# Patient Record
Sex: MALE | Race: WHITE | NOT HISPANIC OR LATINO | Employment: FULL TIME | URBAN - METROPOLITAN AREA
[De-identification: names, ages, dates, MRNs, and addresses within clinical notes are randomized per-mention and may not be internally consistent; named-entity substitution may affect disease eponyms.]

---

## 2017-09-27 DIAGNOSIS — S86.911A KNEE STRAIN, RIGHT, INITIAL ENCOUNTER: ICD-10-CM

## 2017-09-27 DIAGNOSIS — M25.561 KNEE PAIN, RIGHT: Primary | ICD-10-CM

## 2017-10-02 ENCOUNTER — HOSPITAL ENCOUNTER (OUTPATIENT)
Dept: RADIOLOGY | Facility: HOSPITAL | Age: 37
Discharge: HOME | End: 2017-10-02
Attending: NURSE PRACTITIONER
Payer: OTHER MISCELLANEOUS

## 2017-10-02 DIAGNOSIS — M25.561 KNEE PAIN, RIGHT: ICD-10-CM

## 2017-10-02 DIAGNOSIS — S86.911A KNEE STRAIN, RIGHT, INITIAL ENCOUNTER: ICD-10-CM

## 2017-10-02 PROCEDURE — 73721 MRI JNT OF LWR EXTRE W/O DYE: CPT | Mod: TC,RT

## 2017-10-02 PROCEDURE — 73721 MRI JNT OF LWR EXTRE W/O DYE: CPT | Mod: 26,RT,, | Performed by: RADIOLOGY

## 2017-10-11 ENCOUNTER — TELEPHONE (OUTPATIENT)
Dept: ORTHOPEDICS | Facility: CLINIC | Age: 37
End: 2017-10-11

## 2017-10-11 DIAGNOSIS — M25.561 RIGHT KNEE PAIN, UNSPECIFIED CHRONICITY: Primary | ICD-10-CM

## 2017-10-11 NOTE — TELEPHONE ENCOUNTER
----- Message from Connie Albright sent at 10/11/2017 12:38 PM CDT -----  Contact: [employer]  [employer] would like the nurse to please give her a call back today...[phone]

## 2017-10-20 ENCOUNTER — OFFICE VISIT (OUTPATIENT)
Dept: ORTHOPEDICS | Facility: CLINIC | Age: 37
End: 2017-10-20
Payer: OTHER MISCELLANEOUS

## 2017-10-20 ENCOUNTER — HOSPITAL ENCOUNTER (OUTPATIENT)
Dept: RADIOLOGY | Facility: HOSPITAL | Age: 37
Discharge: HOME | End: 2017-10-20
Attending: ORTHOPAEDIC SURGERY
Payer: OTHER MISCELLANEOUS

## 2017-10-20 VITALS
HEIGHT: 71 IN | DIASTOLIC BLOOD PRESSURE: 83 MMHG | SYSTOLIC BLOOD PRESSURE: 137 MMHG | HEART RATE: 90 BPM | BODY MASS INDEX: 30.8 KG/M2 | WEIGHT: 220 LBS

## 2017-10-20 DIAGNOSIS — S86.911A STRAIN OF RIGHT KNEE, INITIAL ENCOUNTER: Primary | ICD-10-CM

## 2017-10-20 DIAGNOSIS — M25.561 RIGHT KNEE PAIN, UNSPECIFIED CHRONICITY: Primary | ICD-10-CM

## 2017-10-20 DIAGNOSIS — M25.561 RIGHT KNEE PAIN, UNSPECIFIED CHRONICITY: ICD-10-CM

## 2017-10-20 PROCEDURE — 73564 X-RAY EXAM KNEE 4 OR MORE: CPT | Mod: 26,RT,, | Performed by: RADIOLOGY

## 2017-10-20 PROCEDURE — 99204 OFFICE O/P NEW MOD 45 MIN: CPT | Mod: S$GLB,,, | Performed by: ORTHOPAEDIC SURGERY

## 2017-10-20 PROCEDURE — 99999 PR PBB SHADOW E&M-EST. PATIENT-LVL III: CPT | Mod: PBBFAC,,, | Performed by: ORTHOPAEDIC SURGERY

## 2017-10-20 PROCEDURE — 73564 X-RAY EXAM KNEE 4 OR MORE: CPT | Mod: TC,PN,RT

## 2017-10-20 PROCEDURE — 73562 X-RAY EXAM OF KNEE 3: CPT | Mod: 26,59,LT, | Performed by: RADIOLOGY

## 2017-10-20 RX ORDER — IBUPROFEN 800 MG/1
800 TABLET ORAL DAILY
COMMUNITY

## 2017-10-20 NOTE — LETTER
October 24, 2017      Myles Griffiths, SAVANNA  [address]  [facility] [address]           [address]  [facility] [address]  Phone: [phone]          Patient: Gomez Hugo   MR Number: [id number]   YOB: 1980   Date of Visit: 10/20/2017       Dear Myles Griffiths:    Thank you for referring Gomez Hugo to me for evaluation. Attached you will find relevant portions of my assessment and plan of care.    If you have questions, please do not hesitate to call me. I look forward to following Gomez Hugo along with you.    Sincerely,    Александр Tsang MD    Enclosure  CC:  No Recipients    If you would like to receive this communication electronically, please contact [email or web address] or [phone] to request more information on enrich-in Link access.    For providers and/or their staff who would like to refer a patient to [facility], please contact us through our one-stop-shop provider referral line, [facility], at [phone].    If you feel you have received this communication in error or would no longer like to receive these types of communications, please e-mail [email or web address]

## 2017-10-24 NOTE — PROGRESS NOTES
History reviewed. No pertinent past medical history.    History reviewed. No pertinent surgical history.    Current Outpatient Prescriptions   Medication Sig    ibuprofen (ADVIL,MOTRIN) 800 MG tablet Take 800 mg by mouth once daily.     No current facility-administered medications for this visit.        Review of patient's allergies indicates:  No Known Allergies    History reviewed. No pertinent family history.    Social History     Social History    Marital status:      Spouse name: N/A    Number of children: N/A    Years of education: N/A     Occupational History    Not on file.     Social History Main Topics    Smoking status: Never Smoker    Smokeless tobacco: Never Used    Alcohol use No    Drug use: No    Sexual activity: Not on file     Other Topics Concern    Not on file     Social History Narrative    No narrative on file       Chief Complaint:   Chief Complaint   Patient presents with    Right Knee - Pain       Consulting Physician: Myles Griffiths NP    History of present illness:    This is a 37 y.o. year old male who complains of right knee pain following a twisting injury on 9/15/17.  He reports that the knee has been popping on him.  He reports he had a slip but did not fall.  He thinks the popping is in his patellar area.  He is wearing a brace because he feels his knee is unstable without it.  His pain is a 0-5 out of 10 depending on activities.    Review of Systems:    Constitution: Denies chills, fever, and sweats.  HENT: Denies headaches or blurry vision.  Cardiovascular: Denies chest pain or irregular heart beat.  Respiratory: Denies cough or shortness of breath.  Gastrointestinal: Denies abdominal pain, nausea, or vomiting.  Musculoskeletal:  Denies muscle cramps.  Neurological: Denies dizziness or focal weakness.  Psychiatric/Behavioral: Normal mental status.  Hematologic/Lymphatic: Denies bleeding problem or easy bruising/bleeding.  Skin: Denies rash or suspicious  "lesions.    Examination:    Vital Signs:    Vitals:    10/20/17 1449   BP: 137/83   Pulse: 90   Weight: 99.8 kg (220 lb)   Height: 5' 11" (1.803 m)   PainSc: 0-No pain   PainLoc: Knee       Body mass index is 30.68 kg/m².    This a well-developed, well nourished patient in no acute distress.    Alert and oriented x 3 and cooperative to examination.       Physical Exam: Right Knee Exam    Gait:   Antalgic    Skin  Rash:   None  Scars:   None    Inspection  Erythema:  None  Bruising:  None  Effusion:  None  Masses:  None  Lymphadenopathy: None    Range of Motion: 0 to 130°    Medial Joint : y  Lateral Joint : y    Patellofemoral Tenderness: None  Patellofemoral Crepitus: None    Lachman:  Normal  Anterior Drawer: Normal  Posterior Drawer: Normal    Stalin's:  Negative  Apley's:  Negative    Varus Stress:  Stable  Valgus Stress:  Stable    Strength:  5/5    Pulses:  Palpable distal    Sensation:  Intact          Imaging: X-rays ordered and reviewed today personally right knee show no acute bony abnormality, fracture or dislocation.  An MRI of his knee is reviewed today shows a questionable anterior cruciate ligament strain and a questionable lateral collateral ligament strain.        Assessment: Right knee pain, unspecified chronicity        Plan:  He strained his knee at the time of his injury.  We discussed the results of the MRI and treatment options.  At this point we'll start him in physical therapy.  We'll allow him to continue to wear her brace.  He can use ibuprofen as necessary for pain and swelling.  We'll see him back at the end of therapy.      DISCLAIMER: This note may have been dictated using voice recognition software and may contain grammatical errors.     NOTE: Consult report sent to referring provider via EPIC EMR.  "

## 2017-11-13 ENCOUNTER — CLINICAL SUPPORT (OUTPATIENT)
Dept: REHABILITATION | Facility: HOSPITAL | Age: 37
End: 2017-11-13
Attending: ORTHOPAEDIC SURGERY
Payer: OTHER MISCELLANEOUS

## 2017-11-13 PROCEDURE — 97161 PT EVAL LOW COMPLEX 20 MIN: CPT | Mod: PN | Performed by: PHYSICAL THERAPIST

## 2017-11-13 PROCEDURE — 97110 THERAPEUTIC EXERCISES: CPT | Mod: PN | Performed by: PHYSICAL THERAPIST

## 2017-11-13 NOTE — PLAN OF CARE
TIME RECORD    Date: 11/13/2017    Start Time:  1050  Stop Time:  1200    PROCEDURES:    TIMED  Procedure Time Min.    Start:  Stop:     Start:  Stop:     Start:  Stop:     Start:  Stop:          UNTIMED  Procedure Time Min.    Start:  Stop:     Start:  Stop:      Total Timed Minutes:  30  Total Timed Units:  2  Total Untimed Units:  1  Charges Billed/# of units:  3    OUTPATIENT PHYSICAL THERAPY   PATIENT EVALUATION  Onset Date: 9/15/2017  Primary Diagnosis: No diagnosis found.  Treatment Diagnosis: Right Knee Pain  No past medical history on file.  Precautions: Fall  Prior Therapy: None  Medications: Gomez Hugo has a current medication list which includes the following prescription(s): ibuprofen.  Nutrition:  Overweight  History of Present Illness: The patient stated he slipped on a wet surface while at work. He stated he his knee but was able to catch himself. The following day he reported the incident and was referred to a company doctor who then referred him to Dr. Tsang. The patient reported that Dr. Tsang said he had a sprained ACL. The patient complains of lateral knee pain and intermittent popping of the right knee.  Prior Level of Function: Independent  Social History: The patient is  and lives with his with a 2 children  Place of Residence (Steps/Adaptations): The patient reports he lives on the first floor of a motel with no steps to enter the room.  Functional Deficits Leading to Referral/Nature of Injury: Pain and limited ROM of the right knee.  Patient Therapy Goals: To return to performing activities at work that he was able to do prior to injury.    Subjective     Gomez Hugo states he slipped on a wet surface while at work on 9/15/2017. The patient reports he twisted his knee but was able to catch himself to prevent from falling. He c/o lateral knee pain and notes intermittent popping of the right knee. The patient stated he has returned to work, but has to limit  his activities such as bending and kneeling secondary to pain.    Pain:  Location: knee   Description: Aching, Dull and Sharp  Activities Which Increase Pain: Bending and Walking  Activities Which Decrease Pain: pain medication, ice and rest  Pain Scale: 1/10 at best 1/10 now  6/10 at worst    Objective     Posture: Slight knee flexion of right knee in standing  Palpation: Moderate soft tissue tenderness was noted with palpation of the lateral aspect of the right knee.   Sensation: Intact to light touch  DTRs:  Range of Motion/Strength:    Knee  Right   Left  Pain/Dysfunction with Movement    AROM PROM MMT AROM PROM MMT    Flexion 128* 130* 5/5 134* 135* 5/5 Pain with MMT   Extension -3* 0* 4+/5 0* 0* 5/5 Pain with MMT       Gait: Without AD  Analysis: The patient ambulates with a slow jesse. His stride length is limited and he ambulates with a wide DARIEN.  Bed Mobility:Independent  Transfers: Independent  Special Tests:   Right  Left  Girth Measurements:    At the Medial Joint Line 41.0 cm 41.5 cm    Lachman test   Negative Negative  Anterior Drawer  Negative Negative  Stalin   Negative Negative  Valgus Stress   Negative Negative  Varus Stress   Negative Negative    Lower Extremity Functional Scale: Raw score: 36  Impairment: 55%    Treatment:     1. HSS in long sitting 10 sec hold x 10  2. GSS in long sitting with belt 10 sec hold x 10  3. Quad sets 3x10  4, Supine SLR 3x10  5. Heel slides in supine with belt 30 reps  6. Stationary Bike x 10 minutes level 4.0    Assessment       Initial Assessment (Pertinent finding, problem list and factors affecting outcome):   1. Decreased ROM  2. Decreased ambulatory status  3. Impaired work demand level  4. Swelling of the right knee at the [facility]    Rehab Potiential: good    Short Term Goals (2 Weeks):   1. The patient will be independent with a written HEP.  2. Increase right knee flexion to 135*  3. Increase knee extension to 0*    Long Term Goals (4 Weeks):   1. Increase  knee strength to 5/5 throughout  2. Decrease MJL measurement to 41.0 cm  3. Increase LEFS raw score to > 70, or < Impairment 12.5%  4. The patient will return to work without limitations on activity level.    Plan     Certification Period: 11/13/2017 to 12/30/2017  Recommended Treatment Plan: 3 times per week for 4 weeks: Gait Training, Group Therapy, Manual Therapy, Moist Heat/ Ice, Patient Education, Self Care, Therapeutic Activites and Therapeutic Exercise        Therapist: Jluis Owens, PT    I CERTIFY THE NEED FOR THESE SERVICES FURNISHED UNDER THIS PLAN OF TREATMENT AND WHILE UNDER MY CARE    Physician's comments: ________________________________________________________________________________________________________________________________________________      Physician's Name: ___________________________________

## 2017-11-16 ENCOUNTER — CLINICAL SUPPORT (OUTPATIENT)
Dept: REHABILITATION | Facility: HOSPITAL | Age: 37
End: 2017-11-16
Attending: ORTHOPAEDIC SURGERY
Payer: OTHER MISCELLANEOUS

## 2017-11-16 DIAGNOSIS — S86.911A STRAIN OF KNEE AND LEG, RIGHT, INITIAL ENCOUNTER: ICD-10-CM

## 2017-11-16 PROCEDURE — 97110 THERAPEUTIC EXERCISES: CPT | Mod: PN

## 2017-11-16 PROCEDURE — 97150 GROUP THERAPEUTIC PROCEDURES: CPT | Mod: PN

## 2017-11-16 NOTE — PATIENT INSTRUCTIONS
Strengthening: Quadriceps Set        Tighten muscles on top of thighs by pushing knees down into surface. Hold _5___ seconds.  Repeat _10___ times per set. Do _3___ sets per session. Do __1-2__ sessions per day.     [email or web address]://[email or web address]     Copyright © [employer]. All rights reserved.     Strengthening: Straight Leg Raise (Phase 1)        Tighten muscles on front of right thigh, then lift leg ____ inches from surface, keeping knee locked.   Repeat __10__ times per set. Do _3___ sets per session. Do __1-2__ sessions per day.     [email or web address]     Copyright © [employer]. All rights reserved.     Self-Mobilization: Heel Slide (Supine)        Slide left heel toward buttocks until a gentle stretch is felt. Relax.  Repeat _10___ times per set. Do _3___ sets per session. Do __1-2__ sessions per day.     [email or web address]     Copyright © [employer]. All rights reserved.     Stretching: Calf - Towel        Sit with knee straight and towel looped around left foot. Gently pull on towel until stretch is felt in calf. Hold __10_ seconds.  Repeat __10__ times per set. Do __1__ sets per session. Do __1-2__ sessions per day.     [email or web address]://[email or web address]     Copyright © [employer]. All rights reserved.                 Terminal Knee Extension (Standing)        Facing anchor with right knee slightly bent and tubing just above knee, gently pull knee back straight. Do not overextend knee.  Repeat __10__ times per set. Do __3__ sets per session. Do __1-2__ sessions per day.     [email or web address]     Copyright © [employer]. All rights reserved.

## 2017-11-16 NOTE — PROGRESS NOTES
"Name: Gomez Hugo  [facility] Number: [id number]  Date of Treatment: 11/16/2017   Diagnosis:   Encounter Diagnosis   Name Primary?    Strain of knee and leg, right, initial encounter        Time in: 0953  Time Out: 1040  Total Treatment Time: 47  Group time: 15    Subjective:    Gomez reports soreness this morning which resolved with mobility.     Objective:    Patient received individual therapy x 32 min, group therapy x 15 min to increase strength, endurance, ROM and flexibility with activities as follows:     Gomez received therapeutic exercises to develop strength, endurance, ROM and flexibility for 47 minutes including:     Bike x 10' L4  Seated hamstring stretches long sitting 10 x 10s R  Long sitting calf stretches 10 x 10s R  Standing TKE R 10/3 with RTB  Standing lat CKC step up R 10/3 to 2"  Supine QS 10/3 B  Supine SLR R 10/3  Supine HS R 10/3  Supine SAQ 10/3  Attempted shuttle B leg press 43# but pt reported knee popping sensation, so stopped activity.     Written and pictorial HEP of ex's in EPIC reviewed and issued to pt. Pt instructed to perform HEP daily and stop if symptoms increase. Educated pt of DOMS effect.     Pt demo good understanding of the education provided. Gomez demonstrated good return demonstration of activities.     Assessment:     Good [place] for first session. Mm fatigue reported after ex's. Denies pain after session.     Pt will continue to benefit from skilled PT intervention. Medical Necessity is demonstrated by:  Requires skilled supervision to complete and progress HEP and Weakness.    Patient is making good progress towards established goals.    Plan:    Continue with established Plan of Care towards PT goals.   "

## 2017-11-24 ENCOUNTER — TELEPHONE (OUTPATIENT)
Dept: REHABILITATION | Facility: HOSPITAL | Age: 37
End: 2017-11-24

## 2017-11-24 ENCOUNTER — CLINICAL SUPPORT (OUTPATIENT)
Dept: REHABILITATION | Facility: HOSPITAL | Age: 37
End: 2017-11-24
Attending: ORTHOPAEDIC SURGERY
Payer: OTHER MISCELLANEOUS

## 2017-11-24 DIAGNOSIS — S86.911A STRAIN OF KNEE AND LEG, RIGHT, INITIAL ENCOUNTER: ICD-10-CM

## 2017-11-24 PROCEDURE — 97110 THERAPEUTIC EXERCISES: CPT | Mod: PN

## 2017-11-24 NOTE — PROGRESS NOTES
"Name: Gomez Hugo  [facility] Number: [id number]  Date of Treatment: 11/24/2017   Diagnosis:   Encounter Diagnosis   Name Primary?    Strain of knee and leg, right, initial encounter        Time in: 1501  Time Out: 1545  Total Treatment Time: 46      Subjective:    Gomez reports no pain today.  Patient reports their pain to be 0/10 on a 0-10 scale with 0 being no pain and 10 being the worst pain imaginable.    Objective    Patient received individual therapy to increase strength, ROM and flexibility with activities as follows:     Gomez received therapeutic exercises to develop strength, ROM and flexibility for 46 minutes including:     Bike x 10 minutes, level 4  Standing gastroc stretch 3 x 30 sec B  CKC 6" step x 30   TKE RTB x 30  HR/TR x 30 B  Hamstring stretch long sitting 3 x 30 sec B  QS long sitting x 30  SLR 2# x 30  Heel slides with sheet x 30  SAQ 2# x 30  Shuttle: 62# B x 30    Written Home Exercises Provided: cont HEP  Pt demo good understanding of the education provided. Gomez demonstrated good return demonstration of activities.     Assessment:     Good tolerance for activity.  Minimal discomfort with TKE exercise.  Pt will continue to benefit from skilled PT intervention. Medical Necessity is demonstrated by:  Fall Risk, Pain limits function of effected part for some activities, Unable to participate fully in daily activities, Requires skilled supervision to complete and progress HEP and Weakness.    Patient is making good progress towards established goals.      Plan:  Continue with established Plan of Care towards PT goals.   "

## 2017-11-28 ENCOUNTER — CLINICAL SUPPORT (OUTPATIENT)
Dept: REHABILITATION | Facility: HOSPITAL | Age: 37
End: 2017-11-28
Payer: OTHER MISCELLANEOUS

## 2017-11-28 DIAGNOSIS — S86.911A STRAIN OF KNEE AND LEG, RIGHT, INITIAL ENCOUNTER: ICD-10-CM

## 2017-11-28 PROCEDURE — 97110 THERAPEUTIC EXERCISES: CPT | Mod: PN | Performed by: PHYSICAL THERAPIST

## 2017-11-28 NOTE — PROGRESS NOTES
"Name: Gomez Hugo  [facility] Number: [id number]  Date of Treatment: 11/28/2017   Diagnosis:   Encounter Diagnosis   Name Primary?    Strain of knee and leg, right, initial encounter        Time in: 1400  Time Out: 1455  Total Treatment Time: 55  Group Time: 0      Subjective:    oGmez reports improvement of symptoms.  Patient reports their pain to be 0/10 on a 0-10 scale with 0 being no pain and 10 being the worst pain imaginable.    Objective    Patient received individual therapy to increase strength, endurance, ROM and flexibility with 0 patients with activities as follows:     Gomez received therapeutic exercises to develop strength, endurance, ROM and flexibility for 55 minutes including:     Bike x 10 minutes, level 4  Long sitting gastroc stretch 10 x 10 sec B  CKC 4" step x 30   HR/TR x 30 B  Hamstring stretch long sitting 10 x 10 sec B  QS long sitting x 30  SLR 2# x 30  Heel slides with strap x 30  Mini squats with PB 3x10  Shuttle: 62# B x 30  Standing TKEs on pulley with 4 plates 3x10  NuStep level 3 x 8 minutes      Assessment:       Pt will continue to benefit from skilled PT intervention. Medical Necessity is demonstrated by:  Continued inability to participate in vocational pursuits, Unable to participate fully in daily activities, Requires skilled supervision to complete and progress HEP and Weakness.    Patient is making good progress towards established goals.    New/Revised goals: NA      Plan:  Continue with established Plan of Care towards PT goals.   "

## 2017-12-05 ENCOUNTER — CLINICAL SUPPORT (OUTPATIENT)
Dept: REHABILITATION | Facility: HOSPITAL | Age: 37
End: 2017-12-05
Attending: ORTHOPAEDIC SURGERY
Payer: OTHER MISCELLANEOUS

## 2017-12-05 DIAGNOSIS — S86.911A STRAIN OF KNEE AND LEG, RIGHT, INITIAL ENCOUNTER: ICD-10-CM

## 2017-12-05 PROCEDURE — 97110 THERAPEUTIC EXERCISES: CPT | Mod: PN

## 2017-12-05 NOTE — PROGRESS NOTES
"Name: Gomez Hugo  Date:   12/05/2017  Primary Diagnosis: .  Problem List Items Addressed This Visit     Strain of knee and leg, right, initial encounter          Time in: 1010  Time Out: 1100  Total Treatment Time: 50  Group Time: 0      Subjective:    Patient reports gradually improving pain level.  Patient reports their pain to be 0/10 on a 0-10 scale with 0 being no pain and 10 being the worst pain imaginable.    Objective    Patient received individual therapy to address strength, endurance, ROM and flexibility with 0 other patients with activities as follows:     Patient received therapeutic exercises to develop strength, endurance, ROM and flexibility for 50 minutes including:     Nustep L3 x 10 min LE's only  Seated hamstring stretch 3/30" RLE  HR 3x10  Wall squat c/ SB 3x10  CKC 4" step 3x10  Shuttle 68# 3x10, x 10 RLE  Long sitting calf stretch 3/30" c/ strap  Heel slides 3x10  SLR 2# 3x10   Bike x 10 min L4    Assessment:     Patient with weak heel raises. Pt with c/o soreness/pain with wall squats, given cues to reduce depth of squat. Weak heel raises.    Pt will continue to benefit from skilled PT intervention. Medical Necessity is demonstrated by:  Pain limits function of effected part for some activities, Unable to participate fully in daily activities and Weakness.    Patient is making good progress towards established goals.    Plan:  Continue with established Plan of Care towards PT goals.     "

## 2018-01-16 ENCOUNTER — DOCUMENTATION ONLY (OUTPATIENT)
Dept: REHABILITATION | Facility: HOSPITAL | Age: 38
End: 2018-01-16

## 2018-01-16 NOTE — PROGRESS NOTES
REHAB SERVICES OUTPATIENT DISCHARGE SUMMARY  Physical Therapy      Name:  Gomez Hugo  Date:  1/16/2018  Date of Evaluation:  11/13/2017  Physician:  Александр Tsang MD  Total # Of Visits:  5  Cancelled:  0  No Shows:  0  Diagnosis:  No diagnosis found.    Physical/Functional Status:  At time of discharge, patient was able to tolerate 55 minutes of therapeutic exercise. Pain level was 0/10    The patient is to be discharged from our Therapy service for the following reason(s):  Patient has not attended therapy since 12/5/2017    Degree of Goal Achievement:  Patient has met all goals    Patient Education:  HEP    Discharge Plan:  Home Program: